# Patient Record
(demographics unavailable — no encounter records)

---

## 2024-10-09 NOTE — HISTORY OF PRESENT ILLNESS
[FreeTextEntry1] : Mr. 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  Mr. TALAVERA has a history of CAD. He denies a change in symptoms. He has no chest pain. No SOB. No palpitations.  LDL was 40.  Not regularly exercising.

## 2024-11-07 NOTE — ASSESSMENT
[FreeTextEntry1] : 1) HCM - flu vacc fiven. TDAP - due/given. PNVX 2019. COVID vacc x 3, encouraged updated.  Met to organize first colon screen, upcoming.  LES, SB, SD, srunblock, exercise/wt loss strategy discussed.  Did Full labs for cpe  with card recently, reviewed, all favorable.   2) DM -   Needs poc A1C, done = 9.1- encouraged continued adherence w trulicity, max metformin, repaglinide, januvia - renewals sent; discussing options of adding insulin vs adding GA1 to his GLP1, attempt to change to eg, mounjaro.  Rx sent to mail away for employee coverage - will see if we can get coverage/?PA, make that switch.  may ultimately need HS lantus, eg as next intervention as he is lower bmi/thinner diabetic and may have had more insufficiency than resistance all along.  Self foot inspection reminded, annual ophtho reminded.  Microalb neg, on olmesartan w excellent bp.  3) bp excellent, on appropriate statin. 4) stented LAD, on excellent secondary prevention regimen, LDL in goal.  Low sat fat diet reminded.

## 2024-11-07 NOTE — HISTORY OF PRESENT ILLNESS
[FreeTextEntry1] : Here for annual exam  [de-identified] : Here for annual exam and to f/u DM, HTN, lipids, asyx CAD w stented LAD. Continues to be a lot more adherent with his meds, which had been a significant problem both at home and when travelling for work in the past; does note, however, that he's had an interruption in his repaglinide and his metformin recently, either not realizing he was out of refills, or home mail away pharmacy service not communicating with him or us (?) - now doing all those renewals.      Has no polyuria, no polydipsia, has no anginal syx.    Pulm/GI/ rosyx is also negative

## 2024-11-07 NOTE — HEALTH RISK ASSESSMENT
[Very Good] : ~his/her~  mood as very good [Yes] : Yes [2 - 3 times a week (3 pts)] : 2 - 3  times a week (3 points) [1 or 2 (0 pts)] : 1 or 2 (0 points) [No] : In the past 12 months have you used drugs other than those required for medical reasons? No [No falls in past year] : Patient reported no falls in the past year [0] : 2) Feeling down, depressed, or hopeless: Not at all (0) [PHQ-2 Negative - No further assessment needed] : PHQ-2 Negative - No further assessment needed [Never] : Never [NO] : No [None] : None [With Family] : lives with family [Employed] : employed [Graduate School] : graduate school [] :  [# Of Children ___] : has [unfilled] children [Feels Safe at Home] : Feels safe at home [Fully functional (bathing, dressing, toileting, transferring, walking, feeding)] : Fully functional (bathing, dressing, toileting, transferring, walking, feeding) [Fully functional (using the telephone, shopping, preparing meals, housekeeping, doing laundry, using] : Fully functional and needs no help or supervision to perform IADLs (using the telephone, shopping, preparing meals, housekeeping, doing laundry, using transportation, managing medications and managing finances) [Reports normal functional visual acuity (ie: able to read med bottle)] : Reports normal functional visual acuity [Smoke Detector] : smoke detector [Carbon Monoxide Detector] : carbon monoxide detector [Safety elements used in home] : safety elements used in home [Seat Belt] :  uses seat belt [Sunscreen] : uses sunscreen [Audit-CScore] : 3 [[id number]] : 0 [High Risk Behavior] : no high risk behavior [Reports changes in hearing] : Reports no changes in hearing [Reports changes in vision] : Reports no changes in vision [Reports changes in dental health] : Reports no changes in dental health [Travel to Developing Areas] : does not  travel to developing areas [TB Exposure] : is not being exposed to tuberculosis [Caregiver Concerns] : does not have caregiver concerns [de-identified] : see hpi

## 2024-11-07 NOTE — PHYSICAL EXAM
[No Acute Distress] : no acute distress [Well Nourished] : well nourished [Well Developed] : well developed [Well-Appearing] : well-appearing [Normal Sclera/Conjunctiva] : normal sclera/conjunctiva [PERRL] : pupils equal round and reactive to light [EOMI] : extraocular movements intact [Normal Outer Ear/Nose] : the outer ears and nose were normal in appearance [Normal Oropharynx] : the oropharynx was normal [Normal TMs] : both tympanic membranes were normal [Normal Nasal Mucosa] : the nasal mucosa was normal [No JVD] : no jugular venous distention [No Lymphadenopathy] : no lymphadenopathy [Supple] : supple [Thyroid Normal, No Nodules] : the thyroid was normal and there were no nodules present [No Respiratory Distress] : no respiratory distress  [No Accessory Muscle Use] : no accessory muscle use [Clear to Auscultation] : lungs were clear to auscultation bilaterally [Normal Percussion] : the chest was normal to percussion [Normal Rate] : normal rate  [Regular Rhythm] : with a regular rhythm [Normal S1, S2] : normal S1 and S2 [No Murmur] : no murmur heard [No Carotid Bruits] : no carotid bruits [No Abdominal Bruit] : a ~M bruit was not heard ~T in the abdomen [No Varicosities] : no varicosities [Pedal Pulses Present] : the pedal pulses are present [No Edema] : there was no peripheral edema [No Palpable Aorta] : no palpable aorta [No Extremity Clubbing/Cyanosis] : no extremity clubbing/cyanosis [Soft] : abdomen soft [Non Tender] : non-tender [Non-distended] : non-distended [No Masses] : no abdominal mass palpated [No HSM] : no HSM [Normal Bowel Sounds] : normal bowel sounds [Normal Supraclavicular Nodes] : no supraclavicular lymphadenopathy [Normal Posterior Cervical Nodes] : no posterior cervical lymphadenopathy [Normal Anterior Cervical Nodes] : no anterior cervical lymphadenopathy [No CVA Tenderness] : no CVA  tenderness [No Spinal Tenderness] : no spinal tenderness [No Joint Swelling] : no joint swelling [Grossly Normal Strength/Tone] : grossly normal strength/tone [No Rash] : no rash [No Skin Lesions] : no skin lesions [Coordination Grossly Intact] : coordination grossly intact [No Focal Deficits] : no focal deficits [Normal Gait] : normal gait [Deep Tendon Reflexes (DTR)] : deep tendon reflexes were 2+ and symmetric [Speech Grossly Normal] : speech grossly normal [Memory Grossly Normal] : memory grossly normal [Normal Affect] : the affect was normal [Alert and Oriented x3] : oriented to person, place, and time [Normal Mood] : the mood was normal [Normal Insight/Judgement] : insight and judgment were intact [] : both feet [de-identified] : no suspicious nevi [TWNoteComboBox3] : +2 [TWNoteComboBox4] : +2

## 2024-11-07 NOTE — COUNSELING
[Sleep ___ hours/day] : Sleep [unfilled] hours/day [Engage in a relaxing activity] : Engage in a relaxing activity [AUDIT-C Screening administered and reviewed] : AUDIT-C Screening administered and reviewed [Benefits of weight loss discussed] : Benefits of weight loss discussed [Encouraged to increase physical activity] : Encouraged to increase physical activity [____ min/wk Activity] : [unfilled] min/wk activity [Patient motivation] : Patient motivation [None] : None [Good understanding] : Patient has a good understanding of lifestyle changes and steps needed to achieve self management goal [de-identified] : see hpi

## 2025-02-01 NOTE — HISTORY OF PRESENT ILLNESS
[FreeTextEntry1] : Mr. TALAVERA presents for the evaluation of CAD Mr. TALAVERA has a history of CAD. He denies a change in symptoms. He has no chest pain. No SOB. No palpitations.

## 2025-02-01 NOTE — DISCUSSION/SUMMARY
[EKG obtained to assist in diagnosis and management of assessed problem(s)] : EKG obtained to assist in diagnosis and management of assessed problem(s) [FreeTextEntry1] : Overall doing well from a cardiac standpoint. Continue present medications. Follow up in 3 months.

## 2025-02-26 NOTE — ASSESSMENT
[FreeTextEntry1] : 1) HCM - flu vacc already this season.  encouraged updated COVID vacc.     2) DM -  A1C, 8.3. encouraged continued adherence with extensive regimen, trying to avoid insulin; he is fairly resistant diabetic with lower bmi, likely some ins insufficiency overall;  agreeing to get back to repaglinide consistency best he can, and increasing mounjaro, which he is tolerating since switching to it from trulicity, to 12.5 at next refill.  Self foot inspection reminded, annual ophtho reminded.  Microalb neg 7/24 - due next visit, on olmesartan w excellent bp.  3) bp excellent, on appropriate statin. 4) stented LAD, on excellent secondary prevention regimen, LDL in goal.  Low sat fat diet reminded.    34 minutes were spent in preparation of this visit, including review of previous notes and test results, interview and examination of the patient, communication with other care contributors, discussion of the plan, arranging for appropriate testing and treatment, and documentation.

## 2025-02-26 NOTE — PHYSICAL EXAM
[No Acute Distress] : no acute distress [Well Nourished] : well nourished [Well Developed] : well developed [Well-Appearing] : well-appearing [No JVD] : no jugular venous distention [Supple] : supple [No Respiratory Distress] : no respiratory distress  [No Accessory Muscle Use] : no accessory muscle use [Clear to Auscultation] : lungs were clear to auscultation bilaterally [Normal Percussion] : the chest was normal to percussion [Normal Rate] : normal rate  [Regular Rhythm] : with a regular rhythm [Normal S1, S2] : normal S1 and S2 [No Carotid Bruits] : no carotid bruits [No Edema] : there was no peripheral edema [No Extremity Clubbing/Cyanosis] : no extremity clubbing/cyanosis

## 2025-02-26 NOTE — COUNSELING
[Sleep ___ hours/day] : Sleep [unfilled] hours/day [Engage in a relaxing activity] : Engage in a relaxing activity [AUDIT-C Screening administered and reviewed] : AUDIT-C Screening administered and reviewed [Benefits of weight loss discussed] : Benefits of weight loss discussed [Encouraged to increase physical activity] : Encouraged to increase physical activity [____ min/wk Activity] : [unfilled] min/wk activity [Patient motivation] : Patient motivation [None] : None [Good understanding] : Patient has a good understanding of lifestyle changes and steps needed to achieve self management goal [de-identified] : see hpi

## 2025-02-26 NOTE — HISTORY OF PRESENT ILLNESS
[de-identified] : Here to f/u DM, HTN, lipids, asyx CAD w stented LAD.  Doing all right.  Had last A1C with card labs, better at 8.3.  Travels almost continually for his work - better about med consistency, although lately on review, it's the repaglinide before meals he doesn't remember at times.   Has no polyuria, no polydipsia, has no anginal syx.

## 2025-06-02 NOTE — HISTORY OF PRESENT ILLNESS
[FreeTextEntry1] : Mr. TALAVERA presents for the evaluation of CAD.  He has had good success with increase Munjaro. A1c better.  BP is on the low side but no symptoms reported.  Cholesterol is at goal. LDL 35.  A1c decreased from 8.3 to 7.8

## 2025-06-02 NOTE — DISCUSSION/SUMMARY
[FreeTextEntry1] : Same cardiac medications.  Agree to titrate GLP-1 to max tolerable dose.   [EKG obtained to assist in diagnosis and management of assessed problem(s)] : EKG obtained to assist in diagnosis and management of assessed problem(s)

## 2025-06-24 NOTE — COUNSELING
[Sleep ___ hours/day] : Sleep [unfilled] hours/day [Engage in a relaxing activity] : Engage in a relaxing activity [AUDIT-C Screening administered and reviewed] : AUDIT-C Screening administered and reviewed [Benefits of weight loss discussed] : Benefits of weight loss discussed [Encouraged to increase physical activity] : Encouraged to increase physical activity [____ min/wk Activity] : [unfilled] min/wk activity [Patient motivation] : Patient motivation [None] : None [Good understanding] : Patient has a good understanding of lifestyle changes and steps needed to achieve self management goal [de-identified] : see hpi

## 2025-06-24 NOTE — ASSESSMENT
[FreeTextEntry1] : 1) HCM - flu vacc already this season.  encouraged updated COVID vacc.     2) DM -  A1C 7.8. encouraged continued adherence with extensive regimen, trying to avoid insulin; he is fairly resistant diabetic with lower bmi, likely some ins insufficiency overall;  will be increasing mounjaro again now to 15 strength - tolerating since switching to it from [facility].  Self foot inspection reminded, annual ophtho reminded.  Microalb neg - on SGLT2, and on olmesartan w excellent bp (almost too good, ?lower ARB dose if persists or calls in w syx)  3) On appropriate statin. 4) stented LAD, on excellent secondary prevention regimen, LDL in goal.  Low sat fat diet reminded.    34 minutes were spent in preparation of this visit, including review of previous notes and test results, interview and examination of the patient, communication with other care contributors, discussion of the plan, arranging for appropriate testing and treatment, and documentation.

## 2025-06-24 NOTE — HISTORY OF PRESENT ILLNESS
[FreeTextEntry1] : Here for f/u [de-identified] : Here to f/u DM, HTN, lipids, asyx CAD w stented LAD.  Doing all right.  Did labs ahead - reviewing, A1C for him very nice at 7.8, reflects increase in mounjaro at last visit along with rest of his regimen.  Still travels often for his work - better about med consistency.   Has no polyuria, no polydipsia, has no anginal syx.